# Patient Record
Sex: MALE | Race: OTHER | Employment: PART TIME | URBAN - METROPOLITAN AREA
[De-identification: names, ages, dates, MRNs, and addresses within clinical notes are randomized per-mention and may not be internally consistent; named-entity substitution may affect disease eponyms.]

---

## 2017-01-04 ENCOUNTER — HOSPITAL ENCOUNTER (OUTPATIENT)
Dept: PHYSICAL THERAPY | Age: 42
Discharge: HOME | End: 2017-01-04
Payer: OTHER GOVERNMENT

## 2017-01-04 PROCEDURE — 97530 THERAPEUTIC ACTIVITIES: CPT

## 2017-01-04 PROCEDURE — 97112 NEUROMUSCULAR REEDUCATION: CPT

## 2017-01-04 PROCEDURE — 97110 THERAPEUTIC EXERCISES: CPT

## 2017-01-04 NOTE — PROGRESS NOTES
PT DAILY TREATMENT NOTE     Patient Name: Lyle Sanchez  Date:2017  : 1975  [x]  Patient  Verified  Payor:  / Plan: [facility]  ACTIVE DUTY AND DEPENDENTS / Product Type:  /    In time: 9:05 am  Out time:10:16 pm  Total Treatment Time (min): 71  Visit #: 2 of 12    Treatment Area: Muscle spasm of back [M62.830]    SUBJECTIVE  Pain Level (0-10 scale): 1  Any medication changes, allergies to medications, adverse drug reactions, diagnosis change, or new procedure performed?: [x] No    [] Yes (see summary sheet for update)  Subjective functional status/changes:   [] No changes reported  \"About a 1 to be honest.\"    OBJECTIVE    Modality rationale: decrease pain to improve the patients ability to tolerate daily activities    Min Type Additional Details    [] Estim:  []Unatt       []IFC  []Premod                        []Other:  []w/ice   []w/heat  Position:  Location:    [] Estim: []Att    []TENS instruct  []NMES                    []Other:  []w/US   []w/ice   []w/heat  Position:  Location:    []  Traction: [] Cervical       []Lumbar                       [] Prone          []Supine                       []Intermittent   []Continuous Lbs:  [] before manual  [] after manual    []  Ultrasound: []Continuous   [] Pulsed                           []1MHz   []3MHz W/cm2:  Location:    []  Iontophoresis with dexamethasone         Location: [] Take home patch   [] In clinic   10 [x]  Ice     []  heat  []  Ice massage  []  Laser   []  Anodyne Position: supine with LE elevated  Location:Left shoulder and scapula    []  Laser with stim  []  Other:  Position:  Location:    []  Vasopneumatic Device Pressure:       [] lo [] med [] hi   Temperature: [] lo [] med [] hi   [x] Skin assessment post-treatment:  [x]intact []redness- no adverse reaction    []redness - adverse reaction:     31 min Therapeutic Exercise:  [x] See flow sheet :   Rationale: increase ROM, increase strength, improve coordination and increase proprioception to improve the patients ability to perform daily activities with decreased pain and symptom levels    10 min Therapeutic Activity:  []  See flow sheet : reviewed benefits of dry needling, indications and expectations  Reviewed HEP       20 min Neuromuscular Re-education:  []  See flow sheet / see dry needling procedure note   Rationale: increase ROM and decrease pain and trigger points  to improve the patients ability to perform daily activities with decreased pain and symptom levels    Dry Needling Procedure Note    Procedure: An intramuscular manual therapy (dry needling) and a neuro-muscular re-education treatment was done to deactivate myofascial trigger points with a 30 gauge filament needle under aseptic technique. Indications:  [x] Myofascial pain and dysfunction [] Muscled spasms  [x] Myalgia/myositis   [] Muscle cramps  [x] Muscle imbalances  [] Temporomandibular Dysfunction  [] Other:    Chart reviewed for the following:  Landen BELLO, PT, DPT, have reviewed the medical history, summary list and precautions/contraindications for Yoshi Jennings.   TIME OUT performed immediately prior to start of procedure:  Landen BELLO, PT, DPT, have performed the following reviews on Yoshi Perezs prior to the start of the session:      [x] Verified patient identification by name and date of birth    [x] Agreement on all muscles being treated was verified   [x] Purpose of dry needling, side effects, possible complications, risks and benefits were explained to the patient   [x] Procedure site(s) verified  [x] Patient was positioned for comfort and draped for privacy  [x] Informed Consent was signed (initial visit) and verified verbally (subsequent visits)  [x] Patient was instructed on the need to report the use of blood thinners and/or immunosuppressant medications  [x] How to respond to possible adverse effects of treatment  [x] Self treatment of post needling soreness: ice, heat (moist heat, heat wraps) and stretching  [x] Opportunity was given to ask any questions, all questions were answered            Time: 9:45 am  Date of procedure: 1/4/2017  Treatment: The following muscles were treated today with intramuscular dry needling  [x]  Left [] Right Middle Trapezius  [x] Left [] Right Lower Trapezius  [x] Left [] Right Longissimus Thoracis / Illiocostalis  [x] Left [] Right Supraspinatus / Infraspinatus    Patient's response to today's treatment:  [x] Latent Twitch Response     [] Muscle relaxation [] Pain Relief  [x] Post needling soreness    [x] without complications  [] Increased Range of Motion   [] Decreased headaches    [] Decreased Tinnitus  [] Other:     Performed by: Jordin Couch, PT, DPT            With   [] TE   [x] TA   [] neuro   [] other: Patient Education: [x] Review HEP    [] Progressed/Changed HEP based on:   [] positioning   [] body mechanics   [] transfers   [] heat/ice application    [] other:      Other Objective/Functional Measures:   Increased use of accessory muscles with respiration      Pain Level (0-10 scale) post treatment: 0 \"just sore\"    ASSESSMENT/Changes in Function:   Pt appeared to tolerate needling well. Had no pain but increased muscle soreness and fatigue. Challenged with q-ped posterior mediastinum expansion. Patient will continue to benefit from skilled PT services to modify and progress therapeutic interventions, address functional mobility deficits, address ROM deficits, address strength deficits, analyze and address soft tissue restrictions, analyze and cue movement patterns, analyze and modify body mechanics/ergonomics, assess and modify postural abnormalities and instruct in home and community integration to attain remaining goals. []  See Plan of Care  []  See progress note/recertification  []  See Discharge Summary         Progress towards goals / Updated goals:  Short Term Goals: STG- To be accomplished in 2 week(s):  1.  Pt will be independent with HEP to encourage prophylaxis. Eval:dispensed 1 exercise  Current:  Reports intermittent compliance     Long Term Goals: LTG- To be accomplished in 5 week(s):  1. Pt will demonstrate ability to lift > 40 lbs without pain. Eval:unable to lift > 10  Current: NA     2. Pt will be able to touch fingers to dorsum of feet to indicate improved mobility needed for ADLs. Eval: 13 in from floor with lenin  Current: NA     3. Pt increase trunk rotation to less that 16 in bilaterally to allow pt to walk and turn with decreased pain levels. Eval:19.5 in bilaterally   Current: NA     4. Pt FOTO score will increase by 10 points to show improvement in function.   Eval:55  Current: will address at visit 5       PLAN  []  Upgrade activities as tolerated     []  Continue plan of care  []  Update interventions per flow sheet       []  Discharge due to:_  []  Other:_      Rebecca Schmitt PT, DPT 1/4/2017  9:19 AM    Future Appointments  Date Time Provider Aj Rivera   1/6/2017 1:00 PM Afshin Servin [facility]   1/11/2017 9:30 AM Rebecca Schmitt PT, DPT [facility]   1/13/2017 12:00 PM Afshin Servin [facility]   1/17/2017 1:00 PM Rebecca Schmitt PT, DPT [facility]   1/19/2017 9:30 AM Rebecca Schmitt PT, DPT [facility]   1/25/2017 10:00 AM Rebecca Schmitt PT, DPT [facility]   1/27/2017 12:30 PM Afshin Servin [facility]   1/31/2017 8:00 AM Rebecca Schmitt PT, DPT [facility]   2/2/2017 9:00 AM Rebecca Schmitt PT, DPT [facility]

## 2017-01-06 ENCOUNTER — HOSPITAL ENCOUNTER (OUTPATIENT)
Dept: PHYSICAL THERAPY | Age: 42
Discharge: HOME | End: 2017-01-06
Payer: OTHER GOVERNMENT

## 2017-01-06 PROCEDURE — 97110 THERAPEUTIC EXERCISES: CPT

## 2017-01-06 PROCEDURE — 97530 THERAPEUTIC ACTIVITIES: CPT

## 2017-01-06 PROCEDURE — 97140 MANUAL THERAPY 1/> REGIONS: CPT

## 2017-01-06 NOTE — PROGRESS NOTES
PT DAILY TREATMENT NOTE     Patient Name: Davis De Santiago  Date:2017  : 1975  [x]  Patient  Verified  Payor:  / Plan: [facility]  ACTIVE DUTY AND DEPENDENTS / Product Type:  /    In time:1:08  Out time:2:16  Total Treatment Time (min): 68  Visit #: 3 of 12    Treatment Area: Muscle spasm of back [M62.830]    SUBJECTIVE  Pain Level (0-10 scale): 0/10  Any medication changes, allergies to medications, adverse drug reactions, diagnosis change, or new procedure performed?: [x] No    [] Yes (see summary sheet for update)  Subjective functional status/changes:   [] No changes reported  \"I actually don't have any pain right now. I feel ok. There is still soreness in the back of my shoulder. \"    OBJECTIVE    Modality rationale: decrease edema, decrease inflammation and decrease pain to improve the patients ability to perform ADLs with less pain.     Min Type Additional Details    [] Estim:  []Unatt       []IFC  []Premod                        []Other:  []w/ice   []w/heat  Position:  Location:    [] Estim: []Att    []TENS instruct  []NMES                    []Other:  []w/US   []w/ice   []w/heat  Position:  Location:    []  Traction: [] Cervical       []Lumbar                       [] Prone          []Supine                       []Intermittent   []Continuous Lbs:  [] before manual  [] after manual    []  Ultrasound: []Continuous   [] Pulsed                           []1MHz   []3MHz W/cm2:  Location:    []  Iontophoresis with dexamethasone         Location: [] Take home patch   [] In clinic   10 [x]  Ice     []  heat  []  Ice massage  []  Laser   []  Anodyne Position:   Location:    []  Laser with stim  []  Other:  Position:  Location:    []  Vasopneumatic Device Pressure:       [] lo [] med [] hi   Temperature: [] lo [] med [] hi   [x] Skin assessment post-treatment:  [x]intact []redness- no adverse reaction    []redness - adverse reaction:       30 min Therapeutic Exercise:  [x] See flow sheet :   Rationale: increase ROM, increase strength and improve coordination to improve the patients ability to perform ADLs with less pain. 18 min Therapeutic Activity:  [x]  See flow sheet :   Rationale: increase ROM, increase strength and improve coordination  to improve the patients ability to perform ADLs with less pain. 10 min Manual Therapy:  STM to infraspinatous, supraspinatous, rhomboids, Rhomboid stretch   Rationale: decrease pain, increase ROM and increase tissue extensibility to improve the patient's ability to perform ADLs with less pain. With   [] TE   [] TA   [] neuro   [] other: Patient Education: [x] Review HEP    [] Progressed/Changed HEP based on:   [] positioning   [] body mechanics   [] transfers   [] heat/ice application    [] other:      Other Objective/Functional Measures:      Pain Level (0-10 scale) post treatment: 0/10    ASSESSMENT/Changes in Function: Pt tolerated ex with no increased pain post tx. Pt received CP post tx. Patient will continue to benefit from skilled PT services to modify and progress therapeutic interventions, address functional mobility deficits, address ROM deficits, address strength deficits, analyze and address soft tissue restrictions, analyze and cue movement patterns and analyze and modify body mechanics/ergonomics to attain remaining goals. []  See Plan of Care  []  See progress note/recertification  []  See Discharge Summary         Progress towards goals / Updated goals:  Short Term Goals: STG- To be accomplished in 2 week(s):  1. Pt will be independent with HEP to encourage prophylaxis. Eval:dispensed 1 exercise  Current: Reports intermittent compliance      Long Term Goals: LTG- To be accomplished in 5 week(s):  1. Pt will demonstrate ability to lift > 40 lbs without pain. Eval:unable to lift > 10  Current: NA      2. Pt will be able to touch fingers to dorsum of feet to indicate improved mobility needed for ADLs.   Eval: 13 in from floor with lenin  Current: NA      3. Pt increase trunk rotation to less that 16 in bilaterally to allow pt to walk and turn with decreased pain levels. Eval:19.5 in bilaterally   Current: NA      4. Pt FOTO score will increase by 10 points to show improvement in function.   Eval:55  Current: will address at visit 5       PLAN  []  Upgrade activities as tolerated     [x]  Continue plan of care  []  Update interventions per flow sheet       []  Discharge due to:_  []  Other:_      Daily Snow 1/6/2017  1:43 PM    Future Appointments  Date Time Provider Aj Rivera   1/11/2017 9:30 AM Dre Oconnell PT, DPT [facility]   1/13/2017 12:00 PM Daily Snow [facility]   1/17/2017 1:00 PM Dre Oconnell PT, DPT [facility]   1/19/2017 9:30 AM Dre Oconnell PT, DPT [facility]   1/25/2017 10:00 AM Dre Oconnell, PT, DPT [facility]   1/27/2017 12:30 PM Daily Snow [facility]   1/31/2017 8:00 AM Dre Oconnell PT, DPT [facility]   2/2/2017 9:00 AM Dre Oconnell PT, DPT [facility]

## 2017-01-11 ENCOUNTER — APPOINTMENT (OUTPATIENT)
Dept: PHYSICAL THERAPY | Age: 42
End: 2017-01-11
Payer: OTHER GOVERNMENT

## 2017-01-13 ENCOUNTER — HOSPITAL ENCOUNTER (OUTPATIENT)
Dept: PHYSICAL THERAPY | Age: 42
End: 2017-01-13
Payer: OTHER GOVERNMENT

## 2017-01-19 ENCOUNTER — APPOINTMENT (OUTPATIENT)
Dept: PHYSICAL THERAPY | Age: 42
End: 2017-01-19
Payer: OTHER GOVERNMENT

## 2017-01-25 ENCOUNTER — APPOINTMENT (OUTPATIENT)
Dept: PHYSICAL THERAPY | Age: 42
End: 2017-01-25
Payer: OTHER GOVERNMENT

## 2017-01-27 ENCOUNTER — APPOINTMENT (OUTPATIENT)
Dept: PHYSICAL THERAPY | Age: 42
End: 2017-01-27
Payer: OTHER GOVERNMENT

## 2017-01-31 ENCOUNTER — APPOINTMENT (OUTPATIENT)
Dept: PHYSICAL THERAPY | Age: 42
End: 2017-01-31
Payer: OTHER GOVERNMENT

## 2017-02-02 ENCOUNTER — APPOINTMENT (OUTPATIENT)
Dept: PHYSICAL THERAPY | Age: 42
End: 2017-02-02